# Patient Record
Sex: FEMALE | Race: WHITE | URBAN - METROPOLITAN AREA
[De-identification: names, ages, dates, MRNs, and addresses within clinical notes are randomized per-mention and may not be internally consistent; named-entity substitution may affect disease eponyms.]

---

## 2023-11-06 DIAGNOSIS — M79.2 NERVE PAIN: Primary | ICD-10-CM

## 2023-11-06 RX ORDER — GABAPENTIN 100 MG/1
2 CAPSULE ORAL 3 TIMES DAILY
COMMUNITY
End: 2023-11-06 | Stop reason: SDUPTHER

## 2023-11-06 NOTE — TELEPHONE ENCOUNTER
Feng Hanna, would you mind calling Ladonna and finding out what she takes the gabapentin for and if she needs a refill? Thanks

## 2023-11-07 NOTE — TELEPHONE ENCOUNTER
Left voice message for pt's stepmother, Gwendolyn, to call back office in regards to refill. Unable to reach pt, as the phone numbers on file do not work any longer.

## 2023-11-08 RX ORDER — LEVETIRACETAM 500 MG/1
500 TABLET ORAL 2 TIMES DAILY
COMMUNITY

## 2023-11-08 RX ORDER — ACETAMINOPHEN 325 MG/1
325 TABLET ORAL EVERY 6 HOURS PRN
COMMUNITY
Start: 2022-04-01

## 2023-11-08 RX ORDER — IBUPROFEN 600 MG/1
600 TABLET ORAL AS NEEDED
COMMUNITY
Start: 2023-07-14

## 2023-11-08 RX ORDER — ATORVASTATIN CALCIUM 20 MG/1
20 TABLET, FILM COATED ORAL DAILY
COMMUNITY

## 2023-11-08 RX ORDER — BUSPIRONE HYDROCHLORIDE 7.5 MG/1
7.5 TABLET ORAL 2 TIMES DAILY
COMMUNITY
End: 2024-01-09 | Stop reason: SDUPTHER

## 2023-11-08 RX ORDER — VENLAFAXINE HYDROCHLORIDE 75 MG/1
75 CAPSULE, EXTENDED RELEASE ORAL DAILY
COMMUNITY
End: 2023-11-21

## 2023-11-08 RX ORDER — GABAPENTIN 100 MG/1
CAPSULE ORAL
Qty: 180 CAPSULE | Refills: 1 | Status: SHIPPED | OUTPATIENT
Start: 2023-11-08 | End: 2024-01-02

## 2023-11-08 RX ORDER — FOLIC ACID 1 MG/1
1 TABLET ORAL EVERY OTHER DAY
COMMUNITY
Start: 2022-04-01

## 2023-11-08 NOTE — TELEPHONE ENCOUNTER
Gwendolyn called back for pt and states that pt does need refill on gabapentin, as pt is taking 100mg, 2 capsules, 3 times a day. Gwendolyn state that pt takes gabapentin for her nerve pain, it was originally prescribed by a doctor in rehab, from [facility], when pt was there after her stroke last year.

## 2023-11-17 DIAGNOSIS — F41.1 GAD (GENERALIZED ANXIETY DISORDER): Primary | ICD-10-CM

## 2023-11-21 RX ORDER — VENLAFAXINE HYDROCHLORIDE 75 MG/1
CAPSULE, EXTENDED RELEASE ORAL
Qty: 30 CAPSULE | Refills: 5 | Status: SHIPPED | OUTPATIENT
Start: 2023-11-21

## 2023-11-27 ENCOUNTER — HOSPITAL ENCOUNTER (EMERGENCY)
Facility: HOSPITAL | Age: 33
Discharge: HOME | End: 2023-11-27
Payer: COMMERCIAL

## 2023-11-27 VITALS
HEART RATE: 80 BPM | TEMPERATURE: 98.6 F | BODY MASS INDEX: 26.52 KG/M2 | WEIGHT: 165 LBS | OXYGEN SATURATION: 96 % | SYSTOLIC BLOOD PRESSURE: 133 MMHG | DIASTOLIC BLOOD PRESSURE: 85 MMHG | RESPIRATION RATE: 18 BRPM | HEIGHT: 66 IN

## 2023-11-27 DIAGNOSIS — R11.2 NAUSEA AND VOMITING, UNSPECIFIED VOMITING TYPE: Primary | ICD-10-CM

## 2023-11-27 LAB
FLUAV RNA RESP QL NAA+PROBE: NOT DETECTED
FLUBV RNA RESP QL NAA+PROBE: NOT DETECTED
SARS-COV-2 RNA RESP QL NAA+PROBE: NOT DETECTED

## 2023-11-27 PROCEDURE — 99284 EMERGENCY DEPT VISIT MOD MDM: CPT | Mod: 25

## 2023-11-27 PROCEDURE — 96374 THER/PROPH/DIAG INJ IV PUSH: CPT

## 2023-11-27 PROCEDURE — 94760 N-INVAS EAR/PLS OXIMETRY 1: CPT

## 2023-11-27 PROCEDURE — 87636 SARSCOV2 & INF A&B AMP PRB: CPT

## 2023-11-27 PROCEDURE — 96361 HYDRATE IV INFUSION ADD-ON: CPT

## 2023-11-27 PROCEDURE — 2500000004 HC RX 250 GENERAL PHARMACY W/ HCPCS (ALT 636 FOR OP/ED)

## 2023-11-27 RX ORDER — ONDANSETRON HYDROCHLORIDE 2 MG/ML
4 INJECTION, SOLUTION INTRAVENOUS ONCE
Status: COMPLETED | OUTPATIENT
Start: 2023-11-27 | End: 2023-11-27

## 2023-11-27 RX ORDER — ONDANSETRON 4 MG/1
4 TABLET, FILM COATED ORAL EVERY 6 HOURS
Qty: 12 TABLET | Refills: 0 | Status: SHIPPED | OUTPATIENT
Start: 2023-11-27 | End: 2023-11-30

## 2023-11-27 RX ADMIN — SODIUM CHLORIDE 1000 ML: 900 INJECTION, SOLUTION INTRAVENOUS at 11:50

## 2023-11-27 RX ADMIN — ONDANSETRON 4 MG: 2 INJECTION INTRAMUSCULAR; INTRAVENOUS at 12:56

## 2023-11-27 ASSESSMENT — PAIN DESCRIPTION - ONSET: ONSET: SUDDEN

## 2023-11-27 ASSESSMENT — PAIN DESCRIPTION - FREQUENCY: FREQUENCY: CONSTANT/CONTINUOUS

## 2023-11-27 ASSESSMENT — PAIN - FUNCTIONAL ASSESSMENT: PAIN_FUNCTIONAL_ASSESSMENT: 0-10

## 2023-11-27 ASSESSMENT — COLUMBIA-SUICIDE SEVERITY RATING SCALE - C-SSRS
1. IN THE PAST MONTH, HAVE YOU WISHED YOU WERE DEAD OR WISHED YOU COULD GO TO SLEEP AND NOT WAKE UP?: NO
2. HAVE YOU ACTUALLY HAD ANY THOUGHTS OF KILLING YOURSELF?: NO
6. HAVE YOU EVER DONE ANYTHING, STARTED TO DO ANYTHING, OR PREPARED TO DO ANYTHING TO END YOUR LIFE?: NO

## 2023-11-27 ASSESSMENT — PAIN DESCRIPTION - PAIN TYPE: TYPE: ACUTE PAIN

## 2023-11-27 ASSESSMENT — PAIN SCALES - GENERAL: PAINLEVEL_OUTOF10: 3

## 2023-11-27 ASSESSMENT — PAIN DESCRIPTION - DESCRIPTORS: DESCRIPTORS: ACHING

## 2023-11-27 ASSESSMENT — PAIN DESCRIPTION - PROGRESSION: CLINICAL_PROGRESSION: NOT CHANGED

## 2023-11-27 NOTE — ED PROVIDER NOTES
HPI   Chief Complaint   Patient presents with    Flu Symptoms     For the past week I have had vomiting diarrhea body aches        Patient is a 33-year-old female presenting to the emergency department for evaluation of vomiting.  Patient states symptoms started about 4 days ago.  Nothing makes it better or worse.  She has not been able to eat or drink any water due to the nausea and vomiting.  Her  is at bedside helping to provide additional history.   said that she got to the patient's house and there are multiple buckets of emesis around her house.  Patient denies any chest pain, shortness of breath, fever, chills, abdominal pain, recent travel, recent illness, cough, congestion, headaches, recent sick contacts.                          No data recorded                Patient History   Past Medical History:   Diagnosis Date    Other specified postprocedural states 03/11/2022    Status post craniectomy     Past Surgical History:   Procedure Laterality Date    MR HEAD ANGIO WO IV CONTRAST  12/7/2021    MR HEAD ANGIO WO IV CONTRAST 12/7/2021 [facility] CLINICAL LEGACY    MR NECK ANGIO WO IV CONTRAST  12/7/2021    MR NECK ANGIO WO IV CONTRAST 12/7/2021 [facility] CLINICAL LEGACY    OTHER SURGICAL HISTORY  03/10/2022    Brain surgery     No family history on file.  Social History     Tobacco Use    Smoking status: Unknown    Smokeless tobacco: Not on file   Substance Use Topics    Alcohol use: Not on file    Drug use: Not on file       Physical Exam   ED Triage Vitals [11/27/23 1058]   Temp Heart Rate Resp BP   37 °C (98.6 °F) (!) 113 18 (!) 149/95      SpO2 Temp Source Heart Rate Source Patient Position   93 % Oral Monitor Sitting      BP Location FiO2 (%)     Left arm --       Physical Exam  Vitals and nursing note reviewed.   Constitutional:       General: She is not in acute distress.     Appearance: Normal appearance. She is obese. She is not ill-appearing or toxic-appearing.   HENT:      Head:  Normocephalic and atraumatic.      Nose: Nose normal.      Mouth/Throat:      Mouth: Mucous membranes are dry.   Eyes:      Extraocular Movements: Extraocular movements intact.      Conjunctiva/sclera: Conjunctivae normal.      Pupils: Pupils are equal, round, and reactive to light.   Cardiovascular:      Rate and Rhythm: Regular rhythm. Tachycardia present.      Pulses: Normal pulses.      Heart sounds: Normal heart sounds.   Pulmonary:      Effort: Pulmonary effort is normal.      Breath sounds: Normal breath sounds.   Abdominal:      General: Abdomen is flat.      Palpations: Abdomen is soft.      Tenderness: There is no abdominal tenderness. There is no guarding or rebound.   Musculoskeletal:         General: Normal range of motion.      Cervical back: Normal range of motion and neck supple.   Skin:     General: Skin is warm and dry.   Neurological:      General: No focal deficit present.      Mental Status: She is alert and oriented to person, place, and time.   Psychiatric:         Mood and Affect: Mood normal.         Behavior: Behavior normal.         ED Course & MDM   Diagnoses as of 11/27/23 1335   Nausea and vomiting, unspecified vomiting type       Medical Decision Making  Parts of this chart have been completed using voice recognition software. Please excuse any errors of transcription. Despite the medical decision making time stamp above-my medical decision making has taken place during the patient's entire visit. My thought process and reason for plan has been formulated from the time that I saw the patient until the time of disposition and is not specific to one specific moment during their visit and furthermore my MDM encompasses this entire chart and not only this text box.    Evaluated this patient independently and my supervising physician was available for consultation.    HPI: Detailed above.    Exam: A medically appropriate exam performed, outlined above, given the known history and  presentation.    History obtained from: Patient    Social Determinants of Health considered during this visit: Lives at home    Labs/Diagnostics:  Labs Reviewed   SARS-COV-2 PCR, SYMPTOMATIC - Normal       Result Value    Coronavirus 2019, PCR Not Detected      Narrative:     This assay has received FDA Emergency Use Authorization (EUA) and is only authorized for the duration of time that circumstances exist to justify the authorization of the emergency use of in vitro diagnostic tests for the detection of SARS-CoV-2 virus and/or diagnosis of COVID-19 infection under section 564(b)(1) of the Act, 21 U.S.C. 360bbb-3(b)(1). This assay is an in vitro diagnostic nucleic acid amplification test for the qualitative detection of SARS-CoV-2 from nasopharyngeal specimens and has been validated for use at [facility]. Negative results do not preclude COVID-19 infections and should not be used as the sole basis for diagnosis, treatment, or other management decisions.     INFLUENZA A AND B PCR - Normal    Flu A Result Not Detected      Flu B Result Not Detected      Narrative:     This assay is an in vitro diagnostic multiplex nucleic acid amplification test for the detection and discrimination of Influenza A & B from nasopharyngeal specimens, and has been validated for use at [facility]. Negative results do not preclude Influenza A/B infections, and should not be used as the sole basis for diagnosis, treatment, or other management decisions. If Influenza A/B and RSV PCR results are negative, testing for Parainfluenza virus, Adenovirus and Metapneumovirus is routinely performed for [facility] pediatric oncology and intensive care inpatients, and is available on other patients by placing an add-on request.     Medications given during visit: Zofran and 1 L normal saline    Considerations/further MDM:  Patient is a 33-year-old female presenting to the emergency department for evaluation of  vomiting.  On physical exam vital signs remarkable for hypertension and tachycardia but otherwise stable and patient is in no acute distress.  Mucosa membranes are dry.  Due to the patient's tachycardia suspect that the patient is dehydrated and therefore 1 L normal saline was ordered.  Patient was also given Zofran for nausea.  COVID and flu swabs negative.  Patient feeling better after fluids and Zofran.  She will be discharged with a prescription for Zofran.  Suspect this is a viral gastroenteritis as opposed to an acute abdomen as patient is nontender to palpation of the abdomen with no rebound or guarding.  Patient will follow-up with primary care doctor outpatient.  She will return to the emergency department with any new or worsening symptoms.  She voiced her understanding.    Procedure  Procedures     Radha Vo PA-C  11/27/23 5994       Radha Vo PA-C  11/27/23 6959

## 2023-11-27 NOTE — DISCHARGE INSTRUCTIONS
Be sure to take all medications, over the counter medications or prescription medications only as directed.     Be sure to follow up as directed in 1-2 days.  All of the details of your follow up instructions are detailed in the follow up section of this packet.      If you are being discharged with any pains medications or muscle relaxers (norco, Vicodin, hydrocodone products, Percocet, oxycodone products, flexeril, cyclobenzaprine, robaxin, norflex, brand or generic, or any other pain controlling medications with the exception of Ibuprofen and regular Tylenol, do not drive or operate machinery, climb ladders or participate in any activity that could potentially put yourself or others at risk should you get dizzy, or be/feel impaired at all.        It is important to remember that your care does not end here and you must continue to monitor your condition closely. Please return to the emergency department for any worsening or concerning signs or symptoms as directed by our conversations and the discharge instructions. Otherwise please follow up with your doctor in 2 days if no better or worse. If you do not have a doctor please contact the referral number on your discharge instructions. Please contact any physician specialists provided in your discharge notes as it is very important to follow up with them regarding your condition. If you are unable to reach the physicians provided, please come back to the Emergency Department at any time.       As always, please take medications as directed. If you have any questions at all regarding your medications, please contact the pharmacist, the emergency department, or your doctor. Before taking any medication prescribed in the Emergency Department, please review the medication side effects and drug interactions (<http://www.rxlist.com/script/main/hp.asp>) as they may interact with your home medications.     Having trouble affording medications? Try [employer]  <http://WIDIP.DUHEM/>! (This is not a hospital endorsed website, merely a recommendation based on my own personal experiences with [employer])      Return to emergency room without delay for ANY new or worsening pains or for any other symptoms or concerns.      Return with worsening pains, nausea, vomiting, trouble breathing, palpitations, shortness of breath, inability to pass stool or urine, loss of control of stool or urine, any numbness or tingling (that is not normal for you), uncontrolled fevers, the passing of blood or other material in stool or urine, rashes, pains or for any other symptoms or concerns you may have.  You are always welcome to return to the ER at any time for any reason or for any other concerns you may have.    Call to make an appointment with PCP or specialist listed to be seen in 1-2 days for further evaluation. Or return here to the ER with any new or added concerns at any time.

## 2024-01-02 DIAGNOSIS — M79.2 NERVE PAIN: ICD-10-CM

## 2024-01-02 RX ORDER — GABAPENTIN 100 MG/1
CAPSULE ORAL
Qty: 180 CAPSULE | Refills: 0 | Status: SHIPPED | OUTPATIENT
Start: 2024-01-02 | End: 2024-01-29

## 2024-01-09 DIAGNOSIS — F39 MOOD DISORDER (CMS-HCC): Primary | ICD-10-CM

## 2024-01-10 RX ORDER — BUSPIRONE HYDROCHLORIDE 7.5 MG/1
7.5 TABLET ORAL 2 TIMES DAILY
Qty: 90 TABLET | Refills: 1 | Status: SHIPPED | OUTPATIENT
Start: 2024-01-10

## 2024-01-18 ENCOUNTER — APPOINTMENT (OUTPATIENT)
Dept: RADIOLOGY | Facility: HOSPITAL | Age: 34
End: 2024-01-18
Payer: COMMERCIAL

## 2024-01-18 ENCOUNTER — HOSPITAL ENCOUNTER (EMERGENCY)
Facility: HOSPITAL | Age: 34
Discharge: HOME | End: 2024-01-18
Attending: STUDENT IN AN ORGANIZED HEALTH CARE EDUCATION/TRAINING PROGRAM
Payer: COMMERCIAL

## 2024-01-18 VITALS
TEMPERATURE: 97.9 F | RESPIRATION RATE: 17 BRPM | DIASTOLIC BLOOD PRESSURE: 110 MMHG | OXYGEN SATURATION: 100 % | HEIGHT: 66 IN | SYSTOLIC BLOOD PRESSURE: 151 MMHG | HEART RATE: 84 BPM | BODY MASS INDEX: 26.52 KG/M2 | WEIGHT: 165 LBS

## 2024-01-18 DIAGNOSIS — M54.2 NECK PAIN: ICD-10-CM

## 2024-01-18 DIAGNOSIS — M25.561 ACUTE PAIN OF RIGHT KNEE: ICD-10-CM

## 2024-01-18 DIAGNOSIS — S09.90XA INJURY OF HEAD, INITIAL ENCOUNTER: ICD-10-CM
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
## 2024-01-18 LAB
ALBUMIN SERPL-MCNC: 4.1 G/DL (ref 3.5–5)
ALP BLD-CCNC: 67 U/L (ref 35–125)
ALT SERPL-CCNC: 14 U/L (ref 5–40)
ANION GAP SERPL CALC-SCNC: 11 MMOL/L
AST SERPL-CCNC: 18 U/L (ref 5–40)
BASOPHILS # BLD AUTO: 0.02 X10*3/UL (ref 0–0.1)
BASOPHILS NFR BLD AUTO: 0.4 %
BILIRUB SERPL-MCNC: 0.2 MG/DL (ref 0.1–1.2)
BUN SERPL-MCNC: 6 MG/DL (ref 8–25)
CALCIUM SERPL-MCNC: 8.9 MG/DL (ref 8.5–10.4)
CHLORIDE SERPL-SCNC: 104 MMOL/L (ref 97–107)
CO2 SERPL-SCNC: 26 MMOL/L (ref 24–31)
CREAT SERPL-MCNC: 0.8 MG/DL (ref 0.4–1.6)
EGFRCR SERPLBLD CKD-EPI 2021: >90 ML/MIN/1.73M*2
EOSINOPHIL # BLD AUTO: 0.02 X10*3/UL (ref 0–0.7)
EOSINOPHIL NFR BLD AUTO: 0.4 %
ERYTHROCYTE [DISTWIDTH] IN BLOOD BY AUTOMATED COUNT: 14.7 % (ref 11.5–14.5)
GLUCOSE SERPL-MCNC: 88 MG/DL (ref 65–99)
HCT VFR BLD AUTO: 34.7 % (ref 36–46)
HGB BLD-MCNC: 12 G/DL (ref 12–16)
IMM GRANULOCYTES # BLD AUTO: 0.02 X10*3/UL (ref 0–0.7)
IMM GRANULOCYTES NFR BLD AUTO: 0.4 % (ref 0–0.9)
LYMPHOCYTES # BLD AUTO: 1.85 X10*3/UL (ref 1.2–4.8)
LYMPHOCYTES NFR BLD AUTO: 34.6 %
MCH RBC QN AUTO: 31.2 PG (ref 26–34)
MCHC RBC AUTO-ENTMCNC: 34.6 G/DL (ref 32–36)
MCV RBC AUTO: 90 FL (ref 80–100)
MONOCYTES # BLD AUTO: 0.4 X10*3/UL (ref 0.1–1)
MONOCYTES NFR BLD AUTO: 7.5 %
NEUTROPHILS # BLD AUTO: 3.04 X10*3/UL (ref 1.2–7.7)
NEUTROPHILS NFR BLD AUTO: 56.7 %
NRBC BLD-RTO: 0 /100 WBCS (ref 0–0)
PLATELET # BLD AUTO: 224 X10*3/UL (ref 150–450)
POTASSIUM SERPL-SCNC: 3.7 MMOL/L (ref 3.4–5.1)
PROT SERPL-MCNC: 6.8 G/DL (ref 5.9–7.9)
RBC # BLD AUTO: 3.85 X10*6/UL (ref 4–5.2)
SODIUM SERPL-SCNC: 141 MMOL/L (ref 133–145)
WBC # BLD AUTO: 5.4 X10*3/UL (ref 4.4–11.3)

## 2024-01-18 PROCEDURE — 36415 COLL VENOUS BLD VENIPUNCTURE: CPT

## 2024-01-18 PROCEDURE — 99285 EMERGENCY DEPT VISIT HI MDM: CPT | Performed by: STUDENT IN AN ORGANIZED HEALTH CARE EDUCATION/TRAINING PROGRAM

## 2024-01-18 PROCEDURE — 2550000001 HC RX 255 CONTRASTS

## 2024-01-18 PROCEDURE — 80053 COMPREHEN METABOLIC PANEL: CPT

## 2024-01-18 PROCEDURE — 74177 CT ABD & PELVIS W/CONTRAST: CPT

## 2024-01-18 PROCEDURE — 73564 X-RAY EXAM KNEE 4 OR MORE: CPT | Mod: RT

## 2024-01-18 PROCEDURE — 70450 CT HEAD/BRAIN W/O DYE: CPT

## 2024-01-18 PROCEDURE — 72125 CT NECK SPINE W/O DYE: CPT

## 2024-01-18 PROCEDURE — 85025 COMPLETE CBC W/AUTO DIFF WBC: CPT

## 2024-01-18 PROCEDURE — 70498 CT ANGIOGRAPHY NECK: CPT

## 2024-01-18 RX ADMIN — IOHEXOL 75 ML: 350 INJECTION, SOLUTION INTRAVENOUS at 14:41

## 2024-01-18 RX ADMIN — IOHEXOL 75 ML: 350 INJECTION, SOLUTION INTRAVENOUS at 14:26

## 2024-01-18 ASSESSMENT — COLUMBIA-SUICIDE SEVERITY RATING SCALE - C-SSRS
6. HAVE YOU EVER DONE ANYTHING, STARTED TO DO ANYTHING, OR PREPARED TO DO ANYTHING TO END YOUR LIFE?: NO
2. HAVE YOU ACTUALLY HAD ANY THOUGHTS OF KILLING YOURSELF?: NO
1. IN THE PAST MONTH, HAVE YOU WISHED YOU WERE DEAD OR WISHED YOU COULD GO TO SLEEP AND NOT WAKE UP?: NO

## 2024-01-18 ASSESSMENT — PAIN - FUNCTIONAL ASSESSMENT: PAIN_FUNCTIONAL_ASSESSMENT: 0-10

## 2024-01-18 ASSESSMENT — PAIN SCALES - GENERAL
PAINLEVEL_OUTOF10: 5 - MODERATE PAIN
PAINLEVEL_OUTOF10: 0 - NO PAIN

## 2024-01-18 ASSESSMENT — PAIN DESCRIPTION - PAIN TYPE: TYPE: ACUTE PAIN
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
## 2024-01-27 DIAGNOSIS — M79.2 NERVE PAIN: ICD-10-CM

## 2024-01-29 RX ORDER — GABAPENTIN 100 MG/1
CAPSULE ORAL
Qty: 180 CAPSULE | Refills: 1 | Status: SHIPPED | OUTPATIENT
Start: 2024-01-29 | End: 2024-03-28

## 2024-03-27 DIAGNOSIS — M79.2 NERVE PAIN: ICD-10-CM

## 2024-03-28 RX ORDER — GABAPENTIN 100 MG/1
CAPSULE ORAL
Qty: 180 CAPSULE | Refills: 1 | Status: SHIPPED | OUTPATIENT
Start: 2024-03-28 | End: 2024-05-15

## 2024-05-14 DIAGNOSIS — M79.2 NERVE PAIN: ICD-10-CM

## 2024-05-15 RX ORDER — GABAPENTIN 100 MG/1
CAPSULE ORAL
Qty: 180 CAPSULE | Refills: 0 | Status: SHIPPED | OUTPATIENT
Start: 2024-05-15

## 2024-06-11 DIAGNOSIS — M79.2 NERVE PAIN: ICD-10-CM

## 2024-06-12 RX ORDER — GABAPENTIN 100 MG/1
CAPSULE ORAL
Qty: 180 CAPSULE | Refills: 0 | Status: SHIPPED | OUTPATIENT
Start: 2024-06-12

## 2024-06-12 NOTE — TELEPHONE ENCOUNTER
Unable to reach pt or leave voice message as mail box is not set up yet. Will attempt to reach pt again later.

## 2024-07-12 DIAGNOSIS — M79.2 NERVE PAIN: ICD-10-CM

## 2024-07-12 RX ORDER — GABAPENTIN 100 MG/1
CAPSULE ORAL
Qty: 180 CAPSULE | Refills: 0 | Status: SHIPPED | OUTPATIENT
Start: 2024-07-12

## 2024-07-15 DIAGNOSIS — G40.909 SEIZURE DISORDER (MULTI): Primary | ICD-10-CM

## 2024-07-15 RX ORDER — LEVETIRACETAM 500 MG/1
500 TABLET ORAL 2 TIMES DAILY
Qty: 60 TABLET | Refills: 1 | Status: SHIPPED | OUTPATIENT
Start: 2024-07-15

## 2024-08-26 DIAGNOSIS — M79.2 NERVE PAIN: ICD-10-CM

## 2024-08-26 DIAGNOSIS — E53.8 FOLIC ACID DEFICIENCY: Primary | ICD-10-CM

## 2024-08-26 RX ORDER — GABAPENTIN 100 MG/1
CAPSULE ORAL
Qty: 180 CAPSULE | Refills: 0 | OUTPATIENT
Start: 2024-08-26

## 2024-08-26 RX ORDER — FOLIC ACID 1 MG/1
1 TABLET ORAL DAILY
Qty: 30 TABLET | Refills: 1 | Status: SHIPPED | OUTPATIENT
Start: 2024-08-26

## 2024-08-30 NOTE — TELEPHONE ENCOUNTER
Left voice message for pt in regards to scheduling an appt. Also left voice message to pt's father, to reach out to pt as well.